# Patient Record
Sex: FEMALE | Race: WHITE | NOT HISPANIC OR LATINO | Employment: FULL TIME | URBAN - METROPOLITAN AREA
[De-identification: names, ages, dates, MRNs, and addresses within clinical notes are randomized per-mention and may not be internally consistent; named-entity substitution may affect disease eponyms.]

---

## 2020-08-06 DIAGNOSIS — N94.89 PELVIC CONGESTION SYNDROME: Primary | ICD-10-CM

## 2020-08-06 DIAGNOSIS — Z12.31 ENCOUNTER FOR SCREENING MAMMOGRAM FOR MALIGNANT NEOPLASM OF BREAST: Primary | ICD-10-CM

## 2020-08-21 ENCOUNTER — HOSPITAL ENCOUNTER (OUTPATIENT)
Dept: RADIOLOGY | Facility: HOSPITAL | Age: 45
Discharge: HOME | End: 2020-08-21
Attending: SPECIALIST
Payer: COMMERCIAL

## 2020-08-21 DIAGNOSIS — N94.89 PELVIC CONGESTION SYNDROME: ICD-10-CM

## 2020-08-21 DIAGNOSIS — Z12.31 ENCOUNTER FOR SCREENING MAMMOGRAM FOR MALIGNANT NEOPLASM OF BREAST: ICD-10-CM

## 2020-08-21 PROCEDURE — 76830 TRANSVAGINAL US NON-OB: CPT | Mod: TC,PO

## 2020-08-21 PROCEDURE — 77067 SCR MAMMO BI INCL CAD: CPT | Mod: TC,PO

## 2020-10-02 ENCOUNTER — HOSPITAL ENCOUNTER (OUTPATIENT)
Dept: RADIOLOGY | Facility: HOSPITAL | Age: 45
Discharge: HOME | End: 2020-10-02
Attending: SPECIALIST
Payer: COMMERCIAL

## 2020-10-02 DIAGNOSIS — R92.2 INCONCLUSIVE MAMMOGRAM: ICD-10-CM

## 2020-10-02 PROCEDURE — 76642 ULTRASOUND BREAST LIMITED: CPT | Mod: TC,50,PO

## 2021-08-20 ENCOUNTER — LAB VISIT (OUTPATIENT)
Dept: FAMILY MEDICINE | Facility: CLINIC | Age: 46
End: 2021-08-20
Payer: COMMERCIAL

## 2021-08-20 DIAGNOSIS — Z20.822 ENCOUNTER FOR LABORATORY TESTING FOR COVID-19 VIRUS: ICD-10-CM

## 2021-08-20 PROCEDURE — U0003 INFECTIOUS AGENT DETECTION BY NUCLEIC ACID (DNA OR RNA); SEVERE ACUTE RESPIRATORY SYNDROME CORONAVIRUS 2 (SARS-COV-2) (CORONAVIRUS DISEASE [COVID-19]), AMPLIFIED PROBE TECHNIQUE, MAKING USE OF HIGH THROUGHPUT TECHNOLOGIES AS DESCRIBED BY CMS-2020-01-R: HCPCS | Performed by: FAMILY MEDICINE

## 2021-08-20 PROCEDURE — U0005 INFEC AGEN DETEC AMPLI PROBE: HCPCS | Performed by: FAMILY MEDICINE

## 2021-08-22 LAB
SARS-COV-2 RNA RESP QL NAA+PROBE: DETECTED
SARS-COV-2- CYCLE NUMBER: 16.6

## 2022-02-25 ENCOUNTER — OFFICE VISIT (OUTPATIENT)
Dept: PODIATRY | Facility: CLINIC | Age: 47
End: 2022-02-25

## 2022-02-25 VITALS
TEMPERATURE: 98 F | DIASTOLIC BLOOD PRESSURE: 77 MMHG | HEART RATE: 97 BPM | SYSTOLIC BLOOD PRESSURE: 117 MMHG | WEIGHT: 172 LBS | HEIGHT: 67 IN | BODY MASS INDEX: 27 KG/M2

## 2022-02-25 DIAGNOSIS — G57.91 NEURITIS OF RIGHT FOOT: ICD-10-CM

## 2022-02-25 DIAGNOSIS — G57.92 NEURITIS OF LEFT FOOT: ICD-10-CM

## 2022-02-25 DIAGNOSIS — M72.2 PLANTAR FASCIITIS, BILATERAL: Primary | ICD-10-CM

## 2022-02-25 DIAGNOSIS — G89.29 CHRONIC FOOT PAIN, UNSPECIFIED LATERALITY: ICD-10-CM

## 2022-02-25 DIAGNOSIS — M79.673 CHRONIC FOOT PAIN, UNSPECIFIED LATERALITY: ICD-10-CM

## 2022-02-25 PROCEDURE — 99999 PR PBB SHADOW E&M-EST. PATIENT-LVL III: CPT | Mod: PBBFAC,,, | Performed by: PODIATRIST

## 2022-02-25 PROCEDURE — 99203 OFFICE O/P NEW LOW 30 MIN: CPT | Mod: S$PBB,,, | Performed by: PODIATRIST

## 2022-02-25 PROCEDURE — 99213 OFFICE O/P EST LOW 20 MIN: CPT | Mod: PBBFAC | Performed by: PODIATRIST

## 2022-02-25 PROCEDURE — 99999 PR PBB SHADOW E&M-EST. PATIENT-LVL III: ICD-10-PCS | Mod: PBBFAC,,, | Performed by: PODIATRIST

## 2022-02-25 PROCEDURE — 99203 PR OFFICE/OUTPT VISIT, NEW, LEVL III, 30-44 MIN: ICD-10-PCS | Mod: S$PBB,,, | Performed by: PODIATRIST

## 2022-02-25 PROCEDURE — 96372 THER/PROPH/DIAG INJ SC/IM: CPT | Mod: PBBFAC

## 2022-02-25 RX ORDER — ROSUVASTATIN CALCIUM 20 MG/1
TABLET, COATED ORAL
COMMUNITY

## 2022-02-25 RX ORDER — BETAMETHASONE SODIUM PHOSPHATE AND BETAMETHASONE ACETATE 3; 3 MG/ML; MG/ML
18 INJECTION, SUSPENSION INTRA-ARTICULAR; INTRALESIONAL; INTRAMUSCULAR; SOFT TISSUE
Status: COMPLETED | OUTPATIENT
Start: 2022-02-25 | End: 2022-02-25

## 2022-02-25 RX ORDER — AA/PROT/LYSINE/METHIO/VIT C/B6 50-12.5 MG
TABLET ORAL
COMMUNITY

## 2022-02-25 RX ORDER — HYDROCODONE BITARTRATE AND ACETAMINOPHEN 10; 325 MG/1; MG/1
TABLET ORAL
COMMUNITY
End: 2023-08-11 | Stop reason: SDUPTHER

## 2022-02-25 RX ORDER — AZELASTINE 1 MG/ML
SPRAY, METERED NASAL
COMMUNITY

## 2022-02-25 RX ADMIN — BETAMETHASONE SODIUM PHOSPHATE AND BETAMETHASONE ACETATE 18 MG: 3; 3 INJECTION, SUSPENSION INTRA-ARTICULAR; INTRALESIONAL; INTRAMUSCULAR at 12:02

## 2022-03-01 NOTE — PROGRESS NOTES
Subjective:       Patient ID: Antonia Salazar is a 46 y.o. female.    Chief Complaint: Heel Pain and Foot Pain  Presents with complaint constant bilateral heel pain, 9-10 months.  States it will get better, not resolve, and then worsen.  She has tried massage, appropriate shoes with support and received heel injections about a week ago with no improvement.  Reports burning radiating pain, significant pain upon 1st steps, severe pain in the morning.  Pain level 10/10    History reviewed. No pertinent past medical history.  Past Surgical History:   Procedure Laterality Date    carpel      both wrist     HERNIA REPAIR      TOE SURGERY       History reviewed. No pertinent family history.  Social History     Socioeconomic History    Marital status: Single   Tobacco Use    Smoking status: Former Smoker     Years: 25.00     Types: Cigarettes    Smokeless tobacco: Current User    Tobacco comment: vape   Substance and Sexual Activity    Alcohol use: Yes     Comment: occasional    Drug use: Never    Sexual activity: Yes     Partners: Male       Current Outpatient Medications   Medication Sig Dispense Refill    azelastine (ASTELIN) 137 mcg (0.1 %) nasal spray azelastine 137 mcg (0.1 %) nasal spray aerosol      coenzyme Q10 10 mg capsule Co Q 10      HYDROcodone-acetaminophen (NORCO)  mg per tablet Norco 10 mg-325 mg tablet   Take 1 tablet every 4 hours by oral route.      linaCLOtide (LINZESS) 290 mcg Cap capsule Linzess 290 mcg capsule      rosuvastatin (CRESTOR) 20 MG tablet rosuvastatin 20 mg tablet       No current facility-administered medications for this visit.     Review of patient's allergies indicates:   Allergen Reactions    Sulfa (sulfonamide antibiotics) Hives       Review of Systems   HENT: Negative for congestion.    Respiratory: Negative for cough and shortness of breath.    Cardiovascular: Negative for leg swelling.   Musculoskeletal: Negative for gait problem.   All other systems  "reviewed and are negative.      Objective:      Vitals:    02/25/22 1125   BP: 117/77   Pulse: 97   Temp: 98 °F (36.7 °C)   Weight: 78 kg (172 lb)   Height: 5' 6.5" (1.689 m)     Physical Exam  Vitals and nursing note reviewed.   Constitutional:       General: She is not in acute distress.  Cardiovascular:      Pulses:           Dorsalis pedis pulses are 2+ on the right side and 2+ on the left side.        Posterior tibial pulses are 2+ on the right side and 2+ on the left side.   Pulmonary:      Effort: Pulmonary effort is normal.   Musculoskeletal:         General: Swelling and tenderness present.      Right foot: Normal range of motion. No deformity.      Left foot: Normal range of motion. No deformity.      Comments: Tense painful calcaneal fat pad with pain at plantar fascial insertion bilateral   Feet:      Right foot:      Skin integrity: Skin integrity normal.      Left foot:      Skin integrity: Skin integrity normal.   Skin:     Capillary Refill: Capillary refill takes less than 2 seconds.   Neurological:      General: No focal deficit present.      Comments: Neuritis Walter's nerve radiating to plantar nerves bilateral feet              Assessment:       1. Plantar fasciitis, bilateral    2. Neuritis of right foot    3. Neuritis of left foot    4. Chronic foot pain, unspecified laterality        Plan:         18 MG BETAMETHASONE IM LEFT HIP      Reviewed plantar fasciitis at length with patient.  Explained to patient with her high level of pain she is definitely experiencing some neuritis with plantar fasciitis.  We discussed neuritis at length due to inflammation nerve and drained inside the heel to the bottom the foot.  Advised patient she developed this due to the length of time this condition has been present and excessive inflammation  Explained plantar fasciitis is a strain/sprain of the supportive band through the arch on the bottom of the foot and needs to be supported properly along with " treatment for inflammation. Explained bracing of the plantar fascia through appropriate arch support is crucial.  We discussed appropriate arch support and variety of   appropriate shoes, how to gradually adjust to wearing arch supports comfortably, always removing insoles first to accommodate supports.  Advised once comfortable should be worn at all times of weight-bearing until pain resolves, then any time for work if possible, excessive activity, exercise, prolonged walking or standing.   We reviewed appropriate shoes for indoors, no flat shoes or walking barefoot.    Reviewed stretching at length, should be performed in the morning before getting out of bed shoes with arch support at bedside    Reviewed ice/cool therapy and frequency this should be performed.    Reviewed topical anti-inflammatory, over-the-counter Voltaren gel  Instructed patient on use of over-the-counter oral anti-inflammatory/NSAID therapy utilizing medication of her choice which is most effective and well tolerated. Take as directed x1 week with meals, discontinue of stomach upset, reduce by 1/2 2nd week  We discussed x-rays patient declined today  Advised with symptoms of carpal tunnel and neuritis in feet we discussed Cymbalta.  Instructed patient to discuss this medication with her PCP.  We did discuss IM cortisone injection to help start the healing process so she can follow-up all above conservative treatments.  Reviewed IM cortisone injection, effectiveness and decreasing inflammation, potential side effects and length of time injection may be beneficial  Patient was in understanding and agreement with treatment plan.  I counseled the patient on their conditions, implications and medical management.  Instructed patient to contact the office with any changes, questions, concerns, worsening of symptoms.   Total face-to-face time, exam, assessment, treatment, discussion, documentation 30 minutes, more than half this time spent on  consultation and coordination of care.  Follow up as needed, if not significant improvement in 2 weeks or not resolved in 4 weeks    This note was created using [employer] voice recognition software that occasionally misinterpreted phrases or words.

## 2022-03-29 ENCOUNTER — TELEPHONE (OUTPATIENT)
Dept: PODIATRY | Facility: CLINIC | Age: 47
End: 2022-03-29

## 2022-03-29 NOTE — TELEPHONE ENCOUNTER
----- Message from Mercy Eckert sent at 3/29/2022  1:07 PM CDT -----  Contact: Patient  Type:  Needs Medical Advice    Who Called:  Patient      Would the patient rather a call back or a response via [facility]? [facility]      Best Call Back Number:  [phone] (home)     Additional Information:  Patient states shots have lasted up till a few days ago and the pain has come back, Its about 60% back     Please message her phone on voicemail or if they need to call anything in use   [facility]

## 2022-03-29 NOTE — TELEPHONE ENCOUNTER
Patient stated injection helped heel pain and now pain has returned and getting about 60% relief only. Patient stated she was instructed to call back with update to see how she was doing.    Pharmacy:  [facility]

## 2022-09-16 DIAGNOSIS — N83.202 LEFT OVARIAN CYST: ICD-10-CM

## 2022-09-16 DIAGNOSIS — N60.02 SOLITARY CYST OF LEFT BREAST: Primary | ICD-10-CM

## 2022-10-21 ENCOUNTER — HOSPITAL ENCOUNTER (OUTPATIENT)
Dept: RADIOLOGY | Facility: HOSPITAL | Age: 47
Discharge: HOME | End: 2022-10-21
Attending: SPECIALIST
Payer: COMMERCIAL

## 2022-10-21 DIAGNOSIS — N60.02 SOLITARY CYST OF LEFT BREAST: ICD-10-CM

## 2022-10-21 PROCEDURE — 77062 BREAST TOMOSYNTHESIS BI: CPT | Mod: TC,PO

## 2022-10-21 PROCEDURE — 76642 ULTRASOUND BREAST LIMITED: CPT | Mod: TC,50,PO

## 2022-12-16 ENCOUNTER — PATIENT MESSAGE (OUTPATIENT)
Dept: FAMILY MEDICINE | Facility: CLINIC | Age: 47
End: 2022-12-16

## 2023-08-03 ENCOUNTER — PATIENT MESSAGE (OUTPATIENT)
Dept: FAMILY MEDICINE | Facility: CLINIC | Age: 48
End: 2023-08-03
Payer: COMMERCIAL

## 2023-08-11 ENCOUNTER — OFFICE VISIT (OUTPATIENT)
Dept: FAMILY MEDICINE | Facility: CLINIC | Age: 48
End: 2023-08-11
Payer: COMMERCIAL

## 2023-08-11 VITALS
HEART RATE: 102 BPM | TEMPERATURE: 98 F | DIASTOLIC BLOOD PRESSURE: 84 MMHG | BODY MASS INDEX: 29.11 KG/M2 | HEIGHT: 66 IN | SYSTOLIC BLOOD PRESSURE: 130 MMHG | WEIGHT: 181.13 LBS | OXYGEN SATURATION: 97 %

## 2023-08-11 DIAGNOSIS — G89.4 CHRONIC PAIN SYNDROME: Primary | ICD-10-CM

## 2023-08-11 DIAGNOSIS — N60.01 BILATERAL BREAST CYSTS: ICD-10-CM

## 2023-08-11 DIAGNOSIS — N60.02 BILATERAL BREAST CYSTS: ICD-10-CM

## 2023-08-11 DIAGNOSIS — E78.00 ELEVATED CHOLESTEROL: ICD-10-CM

## 2023-08-11 DIAGNOSIS — M54.16 LUMBAR RADICULOPATHY: ICD-10-CM

## 2023-08-11 DIAGNOSIS — M54.12 CERVICAL RADICULOPATHY: ICD-10-CM

## 2023-08-11 PROBLEM — E78.5 HLD (HYPERLIPIDEMIA): Status: ACTIVE | Noted: 2023-08-11

## 2023-08-11 PROBLEM — E78.5 HLD (HYPERLIPIDEMIA): Status: RESOLVED | Noted: 2023-08-11 | Resolved: 2023-08-11

## 2023-08-11 PROBLEM — G89.29 CHRONIC PAIN: Status: ACTIVE | Noted: 2023-08-11

## 2023-08-11 PROCEDURE — 99204 PR OFFICE/OUTPT VISIT, NEW, LEVL IV, 45-59 MIN: ICD-10-PCS | Mod: S$GLB,,, | Performed by: FAMILY MEDICINE

## 2023-08-11 PROCEDURE — 3075F SYST BP GE 130 - 139MM HG: CPT | Mod: CPTII,S$GLB,, | Performed by: FAMILY MEDICINE

## 2023-08-11 PROCEDURE — 3008F PR BODY MASS INDEX (BMI) DOCUMENTED: ICD-10-PCS | Mod: CPTII,S$GLB,, | Performed by: FAMILY MEDICINE

## 2023-08-11 PROCEDURE — 3075F PR MOST RECENT SYSTOLIC BLOOD PRESS GE 130-139MM HG: ICD-10-PCS | Mod: CPTII,S$GLB,, | Performed by: FAMILY MEDICINE

## 2023-08-11 PROCEDURE — 1159F PR MEDICATION LIST DOCUMENTED IN MEDICAL RECORD: ICD-10-PCS | Mod: CPTII,S$GLB,, | Performed by: FAMILY MEDICINE

## 2023-08-11 PROCEDURE — 3008F BODY MASS INDEX DOCD: CPT | Mod: CPTII,S$GLB,, | Performed by: FAMILY MEDICINE

## 2023-08-11 PROCEDURE — 3079F PR MOST RECENT DIASTOLIC BLOOD PRESSURE 80-89 MM HG: ICD-10-PCS | Mod: CPTII,S$GLB,, | Performed by: FAMILY MEDICINE

## 2023-08-11 PROCEDURE — 3079F DIAST BP 80-89 MM HG: CPT | Mod: CPTII,S$GLB,, | Performed by: FAMILY MEDICINE

## 2023-08-11 PROCEDURE — 99204 OFFICE O/P NEW MOD 45 MIN: CPT | Mod: S$GLB,,, | Performed by: FAMILY MEDICINE

## 2023-08-11 PROCEDURE — 1159F MED LIST DOCD IN RCRD: CPT | Mod: CPTII,S$GLB,, | Performed by: FAMILY MEDICINE

## 2023-08-11 RX ORDER — GABAPENTIN 300 MG/1
300 CAPSULE ORAL NIGHTLY
Qty: 30 CAPSULE | Refills: 11 | Status: SHIPPED | OUTPATIENT
Start: 2023-08-11 | End: 2024-08-10

## 2023-08-11 RX ORDER — HYDROCODONE BITARTRATE AND ACETAMINOPHEN 10; 325 MG/1; MG/1
1 TABLET ORAL
Qty: 30 TABLET | Refills: 0 | Status: SHIPPED | OUTPATIENT
Start: 2023-08-11 | End: 2023-09-22 | Stop reason: SDUPTHER

## 2023-08-11 RX ORDER — DULOXETIN HYDROCHLORIDE 30 MG/1
30 CAPSULE, DELAYED RELEASE ORAL DAILY
Qty: 30 CAPSULE | Refills: 11 | Status: SHIPPED | OUTPATIENT
Start: 2023-08-11 | End: 2023-11-10

## 2023-08-11 NOTE — PROGRESS NOTES
"  [facility]  Primary Care Clinics - [place], [place]    Family Medicine Office Visit    Chief Complaint   Patient presents with    [facility]        HPI:  48 female new patient.  Chart review shows history of plantar fasciitis, breast cyst, elevated cholesterol, and chronic neck pain    Was former patient of Shane Morrison - [facility] review shows she was getting #120 hydrocodone 10mg per month and #90 valium 10mg per month, but not filled in approximately 1 year.    Hyperlipidemia stable on appropriate intensity statin therapy  Is enrolled in physical therapy, chiropractic care, accupuncture.  Now seeing pain management.  Has MRI with her    States gabapentin was "weird", but she only took this once or twice    MRI review:  bilateral L5-S1 foraminal stenosis affecting S1 Nerve Roots.  Severe C3-4 foraminal stenosis.  C6-7 canal stenosis.    She expresses interest in spinal surgery    ROS: as above    Vitals:    08/11/23 0830   BP: 130/84   Pulse: 102   Temp: 97.5 °F (36.4 °C)   SpO2: 97%   Weight: 82.1 kg (181 lb 1.6 oz)   Height: 5' 6" (1.676 m)      Body mass index is 29.23 kg/m².      General:  AOx3, well nourished and developed in no acute distress  Eyes:  PERRLA, EOMI, vision intact grossly  ENT:  normal hearing, moist oral mucosa  Neck:  trachea midline with no masses or thyromegaly  Heart:  RRR, no murmurs.  No edema noted, extremities warm and well perfused  Lungs:  clear to auscultation bilaterally with symmetric chest movement  Abdomen:  Soft, nontender, nondistended.  Normal bowel sounds  Musculoskeletal:  Normal gait.  Normal posture.  Normal muscular development with no joint swelling.  Neurological:  CN II-XII grossly intact. Symmetric strength and sensation  Psych:  Normal mood and affect.  Able to demonstrate good judgement and personal insight.      Assessment/Plan:    1. Chronic pain syndrome    2. Lumbar radiculopathy  -     Ambulatory referral/consult to Neurosurgery; Future; Expected " date: 08/18/2023    3. Bilateral breast cysts    4. Elevated cholesterol    5. Cervical radiculopathy  -     Ambulatory referral/consult to Neurosurgery; Future; Expected date: 08/18/2023          Patient is actively pursuing numerous alternative therapies, and is motivated to improve symptoms.  Obviously I will not refill medications to the extent that Dr. Morrison was, but 1 daily norco may serve a role here, alongside gabapentin and cymbalta.  As above - placed referral Dr. Adorno  Stable  Stable on statin

## 2023-08-11 NOTE — PATIENT INSTRUCTIONS
Start new medication regimen today:    Gabapentin at night, cymbalta in the morning, and norco as needed  Will place referral to Dr. Adoron for spinal surgery    Follow up 1 month

## 2023-08-14 ENCOUNTER — PATIENT MESSAGE (OUTPATIENT)
Dept: PRIMARY CARE CLINIC | Facility: CLINIC | Age: 48
End: 2023-08-14
Payer: COMMERCIAL

## 2023-09-22 ENCOUNTER — OFFICE VISIT (OUTPATIENT)
Dept: FAMILY MEDICINE | Facility: CLINIC | Age: 48
End: 2023-09-22
Payer: COMMERCIAL

## 2023-09-22 VITALS
TEMPERATURE: 98 F | BODY MASS INDEX: 29.14 KG/M2 | OXYGEN SATURATION: 97 % | SYSTOLIC BLOOD PRESSURE: 120 MMHG | DIASTOLIC BLOOD PRESSURE: 80 MMHG | WEIGHT: 181.31 LBS | HEART RATE: 89 BPM | HEIGHT: 66 IN

## 2023-09-22 DIAGNOSIS — N60.02 BILATERAL BREAST CYSTS: Primary | ICD-10-CM

## 2023-09-22 DIAGNOSIS — N60.01 BILATERAL BREAST CYSTS: Primary | ICD-10-CM

## 2023-09-22 DIAGNOSIS — M54.16 LUMBAR RADICULOPATHY: ICD-10-CM

## 2023-09-22 DIAGNOSIS — E78.00 ELEVATED CHOLESTEROL: ICD-10-CM

## 2023-09-22 DIAGNOSIS — M54.12 CERVICAL RADICULOPATHY: ICD-10-CM

## 2023-09-22 PROCEDURE — 3079F PR MOST RECENT DIASTOLIC BLOOD PRESSURE 80-89 MM HG: ICD-10-PCS | Mod: CPTII,S$GLB,, | Performed by: FAMILY MEDICINE

## 2023-09-22 PROCEDURE — 3008F PR BODY MASS INDEX (BMI) DOCUMENTED: ICD-10-PCS | Mod: CPTII,S$GLB,, | Performed by: FAMILY MEDICINE

## 2023-09-22 PROCEDURE — 3079F DIAST BP 80-89 MM HG: CPT | Mod: CPTII,S$GLB,, | Performed by: FAMILY MEDICINE

## 2023-09-22 PROCEDURE — 1159F PR MEDICATION LIST DOCUMENTED IN MEDICAL RECORD: ICD-10-PCS | Mod: CPTII,S$GLB,, | Performed by: FAMILY MEDICINE

## 2023-09-22 PROCEDURE — 3008F BODY MASS INDEX DOCD: CPT | Mod: CPTII,S$GLB,, | Performed by: FAMILY MEDICINE

## 2023-09-22 PROCEDURE — 99214 OFFICE O/P EST MOD 30 MIN: CPT | Mod: S$GLB,,, | Performed by: FAMILY MEDICINE

## 2023-09-22 PROCEDURE — 3074F SYST BP LT 130 MM HG: CPT | Mod: CPTII,S$GLB,, | Performed by: FAMILY MEDICINE

## 2023-09-22 PROCEDURE — 1159F MED LIST DOCD IN RCRD: CPT | Mod: CPTII,S$GLB,, | Performed by: FAMILY MEDICINE

## 2023-09-22 PROCEDURE — 99214 PR OFFICE/OUTPT VISIT, EST, LEVL IV, 30-39 MIN: ICD-10-PCS | Mod: S$GLB,,, | Performed by: FAMILY MEDICINE

## 2023-09-22 PROCEDURE — 3074F PR MOST RECENT SYSTOLIC BLOOD PRESSURE < 130 MM HG: ICD-10-PCS | Mod: CPTII,S$GLB,, | Performed by: FAMILY MEDICINE

## 2023-09-22 RX ORDER — HYDROCODONE BITARTRATE AND ACETAMINOPHEN 10; 325 MG/1; MG/1
1 TABLET ORAL
Qty: 40 TABLET | Refills: 0 | Status: SHIPPED | OUTPATIENT
Start: 2023-09-22 | End: 2023-09-25 | Stop reason: SDUPTHER

## 2023-09-22 NOTE — PATIENT INSTRUCTIONS
Will make small increase to pain medication, but definitely take cymbalta and gabapentin to help with pain  Will place referral to Dr. Lancaster to investigate spine    Follow up 3 months

## 2023-09-22 NOTE — PROGRESS NOTES
"    [facility]  [facility] Care Clin[facility] - [place], [place]    Family Medicine Office Visit    Chief Complaint   Patient presents with    Follow-up     Back,neck,hand pain         HPI:  Follow up chronic pain.  Has questions about if cymbalta will be effective    Hyperlipidemia stable on appropriate intensity statin therapy    MRI shows both stenosis to lumbar and cervical spine    Has made marked progress reducing reliance on controlled substances    ROS: as above    Vitals:    09/22/23 1132   BP: 120/80   Pulse: 89   Temp: 98.3 °F (36.8 °C)   SpO2: 97%   Weight: 82.2 kg (181 lb 4.8 oz)   Height: 5' 6" (1.676 m)      Body mass index is 29.26 kg/m².      General:  AOx3, well nourished and developed in no acute distress  Eyes:  PERRLA, EOMI, vision intact grossly  ENT:  normal hearing, moist oral mucosa  Neck:  trachea midline with no masses or thyromegaly  Heart:  RRR, no murmurs.  No edema noted, extremities warm and well perfused  Lungs:  clear to auscultation bilaterally with symmetric chest movement  Abdomen:  Soft, nontender, nondistended.  Normal bowel sounds  Musculoskeletal:  Normal gait.  Normal posture.  Normal muscular development with no joint swelling.  Neurological:  CN II-XII grossly intact. Symmetric strength and sensation  Psych:  Normal mood and affect.  Able to demonstrate good judgement and personal insight.      Assessment/Plan:    1. Bilateral breast cysts    2. Elevated cholesterol    3. Lumbar radiculopathy    4. Cervical radiculopathy        Stable  Stable on statin  Start cymbalta and gabapentin to help pain and anxiety  As above      "

## 2023-09-25 ENCOUNTER — PATIENT MESSAGE (OUTPATIENT)
Dept: FAMILY MEDICINE | Facility: CLINIC | Age: 48
End: 2023-09-25
Payer: COMMERCIAL

## 2023-09-25 NOTE — TELEPHONE ENCOUNTER
Last office visit: 9/22/2023    ----- Message from Ioana Donald sent at 9/25/2023 10:28 AM CDT -----  Contact: Patient  Type:  Needs Medical Advice    Who Called: Patient     Pharmacy name and phone #:    [id number] - [place], [place] - [address]  [address]  [place] [address]  Phone: [phone] Fax: [phone]          Would the patient rather a call back or a response via [facility]? Call if needed    Best Call Back Number: [phone] (home)     Additional Information: HYDROcodone-acetaminophen (NORCO)  mg per tablet  Has been out of stock at pharmacy for 2 weeks. Patient is requesting that meds be sent to another pharmacy. Please call to advise

## 2023-09-26 NOTE — TELEPHONE ENCOUNTER
----- Message from Mallorie Hilton sent at 9/26/2023  1:55 PM CDT -----  Type: Needs Medical Advice  Who Called:  pt     Best Call Back Number: [phone]    Additional Information: pt wants to know if  [facility] , [facility] please advise     [facility] [address] [place] [address], [place] - [address] [address]  Phone: [phone] Fax: [phone]

## 2023-09-27 RX ORDER — HYDROCODONE BITARTRATE AND ACETAMINOPHEN 10; 325 MG/1; MG/1
1 TABLET ORAL
Qty: 40 TABLET | Refills: 0 | Status: SHIPPED | OUTPATIENT
Start: 2023-09-27 | End: 2023-10-03 | Stop reason: SDUPTHER

## 2023-10-02 ENCOUNTER — TELEPHONE (OUTPATIENT)
Dept: FAMILY MEDICINE | Facility: CLINIC | Age: 48
End: 2023-10-02
Payer: COMMERCIAL

## 2023-10-02 NOTE — TELEPHONE ENCOUNTER
----- Message from Darwin French sent at 9/29/2023 10:51 AM CDT -----  Regarding: change pharm again  Type:  Needs Medical Advice    Who Called: PT      Pharmacy name and phone #:      [id number] - [place], [place] - [address]  [address] [place] [place] [address]  Phone: [phone] Fax: [phone]        Would the patient rather a call back or a response via [facility]? Call back    Best Call Back Number: [phone]      Additional Information: Sts that [facility] is also out of the HYDROcodone-acetaminophen (NORCO)  mg per tablet and told [facility] try [facility] they have a different distributor and needs to get it changed to there so she can see if they have it in stock won't tell her unless they have a script.   Please advise -- Thank you

## 2023-10-02 NOTE — TELEPHONE ENCOUNTER
Duplicate message.  ----- Message from Ioana Donald sent at 9/29/2023  4:53 PM CDT -----  Contact: Patient  Type:  Needs Medical Advice    Who Called: Patient     Pharmacy name and phone #:      [id number] - [place], [place] - [address]  [address] [place] [address]  Phone: [phone] Fax: [phone]      Would the patient rather a call back or a response via [facility]? Call    Best Call Back Number: [phone] (home)     Additional Information: Patient is calling for a status update on the meds being transferred to [facility]. Please advise

## 2023-10-02 NOTE — TELEPHONE ENCOUNTER
This message was handled by Juan Edmondson LPN and patient will be picking up written Rx tomorrow.  ----- Message from Chantal Jess sent at 10/2/2023  1:14 PM CDT -----  Regarding: Patient Returning Call  Contact: patient at [phone]  Type:  Patient Returning Call    Who Called:  patient at [phone]    Who Left Message for Patient:  Juan Byrnes LPN  Does the patient know what this is regarding?:  yes    Additional Information:  calling to discuss options for medication refill. Please call and advise. Thank you

## 2023-10-02 NOTE — TELEPHONE ENCOUNTER
----- Message from Saeid Starks sent at 10/2/2023 10:42 AM CDT -----  Regarding: Return Call  Contact: patient  Type:  Patient Returning Call    Who Called:patient  Who Left Message for Patient:office nurse   Does the patient know what this is regarding?:Patient cannot find what pharmacy medication sent to HYDROcodone-acetaminophen (NORCO)  mg per tablet  Would the patient rather a call back or a response via [facility]? Current pharmacy stated office staff canceled this order  Best Call Back Number:[phone]  Additional Information:

## 2023-10-03 ENCOUNTER — TELEPHONE (OUTPATIENT)
Dept: FAMILY MEDICINE | Facility: CLINIC | Age: 48
End: 2023-10-03
Payer: COMMERCIAL

## 2023-10-03 RX ORDER — HYDROCODONE BITARTRATE AND ACETAMINOPHEN 10; 325 MG/1; MG/1
1 TABLET ORAL
Qty: 40 TABLET | Refills: 0 | Status: SHIPPED | OUTPATIENT
Start: 2023-10-03 | End: 2023-11-10 | Stop reason: SDUPTHER

## 2023-10-03 NOTE — TELEPHONE ENCOUNTER
----- Message from Jose G Ayala MD sent at 10/3/2023 12:55 PM CDT -----  Regarding: RE: MEDICINE PRESCRIPTION  Medication was sent to pharmacy on 9/27  ----- Message -----  From: Juan Bynres LPN  Sent: 10/2/2023   4:28 PM CDT  To: Jose G Ayala MD  Subject: FW: MEDICINE PRESCRIPTION                          ----- Message -----  From: Tori Weems  Sent: 10/2/2023   4:25 PM CDT  To: Jamie PFEIFFER Staff  Subject: MEDICINE PRESCRIPTION                            Good evening,    Patient stated that she will be by tomorrow around 130pm to  prescription.  If you have any questions, please contact patient before she arrives.

## 2023-10-03 NOTE — TELEPHONE ENCOUNTER
Completed yesterday, duplicate message.  ----- Message from Mallorie Hilton sent at 10/2/2023  3:59 PM CDT -----  Type: Needs Medical Advice  Who Called:  pt     Best Call Back Number: [phone]    Additional Information: pt calling in regards to [employer] message wants paper copy of rx please advise

## 2023-10-06 ENCOUNTER — TELEPHONE (OUTPATIENT)
Dept: FAMILY MEDICINE | Facility: CLINIC | Age: 48
End: 2023-10-06
Payer: COMMERCIAL

## 2023-10-06 LAB
HUMAN PAPILLOMAVIRUS (HPV): NORMAL
PAP RECOMMENDATION EXT: NORMAL
PAP SMEAR: NORMAL

## 2023-10-06 RX ORDER — HYDROCODONE BITARTRATE AND ACETAMINOPHEN 10; 300 MG/1; MG/1
1 TABLET ORAL DAILY
Qty: 30 EACH | Refills: 0 | Status: SHIPPED | OUTPATIENT
Start: 2023-10-06 | End: 2023-11-10

## 2023-10-06 NOTE — TELEPHONE ENCOUNTER
----- Message from Kenna Carlson sent at 10/6/2023  7:46 AM CDT -----  Type:  Patient Returning Call    Who Called:pt   Who Left Message for Patient:  Does the patient know what this is regarding?:rx   Would the patient rather a call back or a response via [facility]? Call   Best Call Back Number:[phone]  Additional Information: pt states she would like rx called in to the original pharmacy [facility]  and states they have the 10-300mg in stock but they will not last long. Pt would like rx called in to them

## 2023-10-06 NOTE — TELEPHONE ENCOUNTER
----- Message from Mala Hernandez sent at 10/6/2023  2:27 PM CDT -----  Contact: pt  Type: Needs Medical Advice         Who Called: pt  Best Call Back Number:[phone]  Additional Information: Requesting a call back regarding pt is following up on the rx request. Pt worried pharm will run out and not be able to fill. Pt worried that she will not be able to get her rx. Pt asking for the office to call her please.   Please Advise- Thank you

## 2023-10-10 ENCOUNTER — PATIENT MESSAGE (OUTPATIENT)
Dept: FAMILY MEDICINE | Facility: CLINIC | Age: 48
End: 2023-10-10
Payer: COMMERCIAL

## 2023-11-10 ENCOUNTER — OFFICE VISIT (OUTPATIENT)
Dept: FAMILY MEDICINE | Facility: CLINIC | Age: 48
End: 2023-11-10
Payer: COMMERCIAL

## 2023-11-10 VITALS
WEIGHT: 176.38 LBS | HEART RATE: 84 BPM | BODY MASS INDEX: 28.34 KG/M2 | SYSTOLIC BLOOD PRESSURE: 118 MMHG | HEIGHT: 66 IN | OXYGEN SATURATION: 98 % | TEMPERATURE: 98 F | DIASTOLIC BLOOD PRESSURE: 75 MMHG

## 2023-11-10 DIAGNOSIS — M54.12 CERVICAL RADICULOPATHY: ICD-10-CM

## 2023-11-10 DIAGNOSIS — Z12.31 SCREENING MAMMOGRAM FOR BREAST CANCER: ICD-10-CM

## 2023-11-10 DIAGNOSIS — M54.16 LUMBAR RADICULOPATHY: ICD-10-CM

## 2023-11-10 DIAGNOSIS — E78.00 ELEVATED CHOLESTEROL: Primary | ICD-10-CM

## 2023-11-10 PROCEDURE — 3008F PR BODY MASS INDEX (BMI) DOCUMENTED: ICD-10-PCS | Mod: CPTII,S$GLB,, | Performed by: FAMILY MEDICINE

## 2023-11-10 PROCEDURE — 3074F SYST BP LT 130 MM HG: CPT | Mod: CPTII,S$GLB,, | Performed by: FAMILY MEDICINE

## 2023-11-10 PROCEDURE — 3078F PR MOST RECENT DIASTOLIC BLOOD PRESSURE < 80 MM HG: ICD-10-PCS | Mod: CPTII,S$GLB,, | Performed by: FAMILY MEDICINE

## 2023-11-10 PROCEDURE — 99214 OFFICE O/P EST MOD 30 MIN: CPT | Mod: S$GLB,,, | Performed by: FAMILY MEDICINE

## 2023-11-10 PROCEDURE — 99214 PR OFFICE/OUTPT VISIT, EST, LEVL IV, 30-39 MIN: ICD-10-PCS | Mod: S$GLB,,, | Performed by: FAMILY MEDICINE

## 2023-11-10 PROCEDURE — 3078F DIAST BP <80 MM HG: CPT | Mod: CPTII,S$GLB,, | Performed by: FAMILY MEDICINE

## 2023-11-10 PROCEDURE — 3008F BODY MASS INDEX DOCD: CPT | Mod: CPTII,S$GLB,, | Performed by: FAMILY MEDICINE

## 2023-11-10 PROCEDURE — 3074F PR MOST RECENT SYSTOLIC BLOOD PRESSURE < 130 MM HG: ICD-10-PCS | Mod: CPTII,S$GLB,, | Performed by: FAMILY MEDICINE

## 2023-11-10 RX ORDER — HYDROCODONE BITARTRATE AND ACETAMINOPHEN 10; 325 MG/1; MG/1
1 TABLET ORAL
Qty: 40 TABLET | Refills: 0 | Status: SHIPPED | OUTPATIENT
Start: 2023-11-10 | End: 2023-12-13 | Stop reason: SDUPTHER

## 2023-11-10 NOTE — PATIENT INSTRUCTIONS
Get mammogram at [facility] medication  Followup with Dr. Lancaster    Follow up 3 months

## 2023-11-10 NOTE — PROGRESS NOTES
"    [facility]  [facility] Care Clin[facility] - [place], [place]    Family Medicine Office Visit    Chief Complaint   Patient presents with    Medication Refill        HPI:  followup    Sent to Dr. Lancaster previously, given MRI results of stenosis to cervical and lumbar spine - has appointment next week    Hyperlipidemia stable on appropriate intensity statin therapy  Still making notable attempts to wean down controlled substance reliance    Needs mammogram    ROS: as above    Vitals:    11/10/23 1159   BP: 118/75   Pulse: 84   Temp: 98.2 °F (36.8 °C)   SpO2: 98%   Weight: 80 kg (176 lb 6.4 oz)   Height: 5' 6" (1.676 m)      Body mass index is 28.47 kg/m².      General:  AOx3, well nourished and developed in no acute distress  Eyes:  PERRLA, EOMI, vision intact grossly  ENT:  normal hearing, moist oral mucosa  Neck:  trachea midline with no masses or thyromegaly  Heart:  RRR, no murmurs.  No edema noted, extremities warm and well perfused  Lungs:  clear to auscultation bilaterally with symmetric chest movement  Abdomen:  Soft, nontender, nondistended.  Normal bowel sounds  Musculoskeletal:  Normal gait.  Normal posture.  Normal muscular development with no joint swelling.  Neurological:  CN II-XII grossly intact. Symmetric strength and sensation  Psych:  Normal mood and affect.  Able to demonstrate good judgement and personal insight.      Assessment/Plan:    1. Elevated cholesterol    2. Lumbar radiculopathy    3. Cervical radiculopathy    4. Screening mammogram for breast cancer  -     Mammo Digital Screening Bilat w/ [facility]; Future; Expected date: 11/10/2023    Other orders  -     HYDROcodone-acetaminophen (NORCO)  mg per tablet; Take 1 tablet by mouth every 24 hours as needed for Pain.  Dispense: 40 tablet; Refill: 0          Stable  Has neurosurgery follow up pending  As above  Get mammogram    "

## 2023-11-17 ENCOUNTER — PATIENT MESSAGE (OUTPATIENT)
Dept: ADMINISTRATIVE | Facility: HOSPITAL | Age: 48
End: 2023-11-17
Payer: COMMERCIAL

## 2023-11-22 ENCOUNTER — PATIENT OUTREACH (OUTPATIENT)
Dept: ADMINISTRATIVE | Facility: HOSPITAL | Age: 48
End: 2023-11-22
Payer: COMMERCIAL

## 2023-11-22 NOTE — PROGRESS NOTES
Population Health Chart Review & Patient Outreach Details    Outreach Performed: YES Portal    Additional [facility] Notes:           Updates Requested / Reviewed:      Updated Care Coordination Note and External Sources: [facility]         Health Maintenance Topics Overdue:    Health Maintenance Due   Topic Date Due    Hepatitis C Screening  Never done    COVID-19 Vaccine (1) Never done    HIV Screening  Never done    TETANUS VACCINE  Never done    Sign Pain Contract  Never done    Complete Opioid Risk Tool  Never done    Hemoglobin A1c (Diabetic Prevention Screening)  Never done    Influenza Vaccine (1) Never done    Mammogram  10/21/2023         Health Maintenance Topic(s) Outreach Outcomes & Actions Taken:    Cervical Cancer Screening - Outreach Outcomes & Actions Taken  : External Records Uploaded & Care Team Updated if Applicable    Breast Cancer Screening - Outreach Outcomes & Actions Taken  : Mammogram Screening Scheduled Pt had Mammogram today at [facility]

## 2023-12-12 ENCOUNTER — TELEPHONE (OUTPATIENT)
Dept: FAMILY MEDICINE | Facility: CLINIC | Age: 48
End: 2023-12-12
Payer: COMMERCIAL

## 2023-12-12 NOTE — TELEPHONE ENCOUNTER
LOV 11/10/23. NOV 02/12/24 See message below concerning refill and advise. Patient also stated that she is also now on Estrogen and Progesterone.  Also her Neuro appt is on Dec 22

## 2023-12-12 NOTE — TELEPHONE ENCOUNTER
----- Message from Darwin French sent at 12/12/2023  1:17 PM CST -----  Regarding: Refill request  Type:  RX Refill Request    Who Called: pt  Refill or New Rx:refill    RX Name and Strength:HYDROcodone-acetaminophen (NORCO)  mg per tablet  How is the patient currently taking it? (ex. 1XDay):as directed  Is this a 30 day or 90 day RX:30    Preferred Pharmacy with phone number   [id number] - [place] [address] [address]  [address] [place] [address] [place] [address]  Phone: [phone] Fax: [phone]      Local or Mail Order:local  Ordering Provider:Jamie    Would the patient rather a call back or a response via [facility]? Call back    Best Call Back Number:[phone]      Additional Information:  sts she wants Dr Ayala to know she is also now on Estrogen and Progesterone.  Also her Neuro appt is on Dec 22.    Please advise -- Thank you

## 2023-12-13 RX ORDER — HYDROCODONE BITARTRATE AND ACETAMINOPHEN 10; 325 MG/1; MG/1
1 TABLET ORAL
Qty: 40 TABLET | Refills: 0 | Status: SHIPPED | OUTPATIENT
Start: 2023-12-13 | End: 2023-12-15 | Stop reason: SDUPTHER

## 2023-12-14 ENCOUNTER — TELEPHONE (OUTPATIENT)
Dept: FAMILY MEDICINE | Facility: CLINIC | Age: 48
End: 2023-12-14
Payer: COMMERCIAL

## 2023-12-14 NOTE — TELEPHONE ENCOUNTER
----- Message from Darwin French sent at 12/14/2023  1:19 PM CST -----  Regarding: RX issues  Type:  Needs Medical Advice    Who Called: PT      Pharmacy name and phone #:    [id number] [place] - [address]  [address]  [place] [address]  Phone: [phone] Fax: [phone]      Would the patient rather a call back or a response via [facility]? Call back     Best Call Back Number: [phone]      Additional Information: Sts that the pharm still won't fill the RX due to the prescription is written wrong.  Its written for 40 pills at 1 a day but for 30 days ..... Sts needs to be corrected to taking more per day if needed.  Please advise == Thank you Please advise -- Thank you

## 2023-12-15 ENCOUNTER — HOSPITAL ENCOUNTER (OUTPATIENT)
Dept: RADIOLOGY | Facility: HOSPITAL | Age: 48
Discharge: HOME | End: 2023-12-15
Attending: FAMILY MEDICINE
Payer: COMMERCIAL

## 2023-12-15 DIAGNOSIS — Z12.31 SCREENING MAMMOGRAM FOR BREAST CANCER: ICD-10-CM

## 2023-12-15 PROCEDURE — 77063 BREAST TOMOSYNTHESIS BI: CPT | Mod: 26,,, | Performed by: RADIOLOGY

## 2023-12-15 PROCEDURE — 77067 MAMMO DIGITAL SCREENING BILAT WITH TOMO: ICD-10-PCS | Mod: 26,,, | Performed by: RADIOLOGY

## 2023-12-15 PROCEDURE — 77063 MAMMO DIGITAL SCREENING BILAT WITH TOMO: ICD-10-PCS | Mod: 26,,, | Performed by: RADIOLOGY

## 2023-12-15 PROCEDURE — 77067 SCR MAMMO BI INCL CAD: CPT | Mod: TC

## 2023-12-15 PROCEDURE — 77067 SCR MAMMO BI INCL CAD: CPT | Mod: 26,,, | Performed by: RADIOLOGY

## 2023-12-15 RX ORDER — HYDROCODONE BITARTRATE AND ACETAMINOPHEN 10; 325 MG/1; MG/1
1 TABLET ORAL EVERY 12 HOURS PRN
Qty: 40 TABLET | Refills: 0 | Status: SHIPPED | OUTPATIENT
Start: 2023-12-15 | End: 2024-01-11 | Stop reason: SDUPTHER

## 2023-12-18 ENCOUNTER — TELEPHONE (OUTPATIENT)
Dept: FAMILY MEDICINE | Facility: CLINIC | Age: 48
End: 2023-12-18
Payer: COMMERCIAL

## 2023-12-18 NOTE — TELEPHONE ENCOUNTER
----- Message from Mala [place] sent at 12/15/2023 10:04 AM CST -----  Contact: pt  Type: Needs Medical Advice      Who Called:Pt    Best Call Back Number:[phone]    Additional Information: Requesting a call back regarding Pt is following up on the request for the change in her rx HYDROcodone-acetaminophen (NORCO)  mg per tablet. Insurance will only cover a 30 day supply at a time. Pt needs office to have the rx take 1 to 2 daily as needed for pain. Pt is out and in pain.       [id number] - [place] [address] [address]  [address] [place] [address]  Phone: [phone] Fax: [phone]      Please Advise- Thank you

## 2024-01-11 NOTE — TELEPHONE ENCOUNTER
Last office visit: 11/10/2023  ----- Message from Darwin French sent at 1/11/2024 12:57 PM CST -----  Regarding: Refill request  Type:  RX Refill Request    Who Called: Pt  Refill or New Rx:refill    RX Name and Strength:HYDROcodone-acetaminophen (NORCO)  mg per tablet   How is the patient currently taking it? (ex. 1XDay):2xday as needed  Is this a 30 day or 90 day RX:30    Preferred Pharmacy with phone number:  [id number] - [place], [place] - [address] [facility] [address]  [address]  [place] [address]  Phone: [phone] Fax: [phone]        Local or Mail Order:local  Ordering Provider:Jamie    Would the patient rather a call back or a response via [facility]? Call back    Best Call Back Number:[phone]      Additional Information: STS SHE ALSO NEEDS TO TALK TO THE OFFICE ABOUT THE NEUROLOGIST THAT SHE WAS REFFERED TO.  THEY KEEP CONSTANTLY CANCELING HER.  NEEDS TO KNOW WHAT SHE NEEDS TO DO.     Please advise -- Thank you

## 2024-01-12 ENCOUNTER — PATIENT MESSAGE (OUTPATIENT)
Dept: FAMILY MEDICINE | Facility: CLINIC | Age: 49
End: 2024-01-12
Payer: COMMERCIAL

## 2024-01-12 RX ORDER — HYDROCODONE BITARTRATE AND ACETAMINOPHEN 10; 325 MG/1; MG/1
1 TABLET ORAL EVERY 12 HOURS PRN
Qty: 35 TABLET | Refills: 0 | Status: SHIPPED | OUTPATIENT
Start: 2024-01-12

## 2024-02-15 ENCOUNTER — TELEPHONE (OUTPATIENT)
Dept: FAMILY MEDICINE | Facility: CLINIC | Age: 49
End: 2024-02-15
Payer: COMMERCIAL

## 2024-02-15 NOTE — TELEPHONE ENCOUNTER
----- Message from Genesis Etienne sent at 2/15/2024 12:58 PM CST -----  Type:  RX Refill Request    Who Called:  pt  Refill or New Rx:  refill  RX Name and Strength:  HYDROcodone-acetaminophen (NORCO)  mg per tablet  How is the patient currently taking it? (ex. 1XDay):  as directed  Is this a 30 day or 90 day RX:  30  Preferred Pharmacy with phone number:   [id number] [place] [id number] [address]  [address] [address]  Phone: [phone] Fax: [phone]  Best Call Back Number:  [phone]  Additional Information:  pt is calling in regards to getting her Rx filled. Please call back and advise. Thanks!

## 2024-02-16 ENCOUNTER — TELEPHONE (OUTPATIENT)
Dept: FAMILY MEDICINE | Facility: CLINIC | Age: 49
End: 2024-02-16
Payer: COMMERCIAL

## 2024-02-16 NOTE — TELEPHONE ENCOUNTER
Patient informed that she will need to be seen before getting referral to Pain management. Also informed that Dr. Ayala has made the  personal decision to not prescribe narcotic meds, She voiced  understanding.

## 2024-02-22 ENCOUNTER — PATIENT MESSAGE (OUTPATIENT)
Dept: FAMILY MEDICINE | Facility: CLINIC | Age: 49
End: 2024-02-22
Payer: COMMERCIAL

## 2024-11-04 ENCOUNTER — HOSPITAL ENCOUNTER (EMERGENCY)
Facility: HOSPITAL | Age: 49
Discharge: HOME | End: 2024-11-04
Attending: FAMILY MEDICINE
Payer: COMMERCIAL

## 2024-11-04 VITALS
SYSTOLIC BLOOD PRESSURE: 111 MMHG | HEIGHT: 66 IN | RESPIRATION RATE: 16 BRPM | DIASTOLIC BLOOD PRESSURE: 64 MMHG | WEIGHT: 168 LBS | TEMPERATURE: 98 F | OXYGEN SATURATION: 100 % | BODY MASS INDEX: 27 KG/M2 | HEART RATE: 89 BPM

## 2024-11-04 DIAGNOSIS — R52 PAIN: ICD-10-CM

## 2024-11-04 DIAGNOSIS — M25.562 ACUTE PAIN OF LEFT KNEE: Primary | ICD-10-CM

## 2024-11-04 PROCEDURE — 96372 THER/PROPH/DIAG INJ SC/IM: CPT | Performed by: FAMILY MEDICINE

## 2024-11-04 PROCEDURE — 73562 X-RAY EXAM OF KNEE 3: CPT | Mod: TC,LT

## 2024-11-04 PROCEDURE — 73560 X-RAY EXAM OF KNEE 1 OR 2: CPT | Mod: 26,LT,, | Performed by: RADIOLOGY

## 2024-11-04 PROCEDURE — 99284 EMERGENCY DEPT VISIT MOD MDM: CPT | Mod: 25

## 2024-11-04 PROCEDURE — 63600175 PHARM REV CODE 636 W HCPCS: Performed by: FAMILY MEDICINE

## 2024-11-04 RX ORDER — KETOROLAC TROMETHAMINE 30 MG/ML
30 INJECTION, SOLUTION INTRAMUSCULAR; INTRAVENOUS
Status: COMPLETED | OUTPATIENT
Start: 2024-11-04 | End: 2024-11-04

## 2024-11-04 RX ORDER — KETOROLAC TROMETHAMINE 10 MG/1
10 TABLET, FILM COATED ORAL EVERY 6 HOURS PRN
Qty: 30 TABLET | Refills: 0 | Status: SHIPPED | OUTPATIENT
Start: 2024-11-04

## 2024-11-04 RX ADMIN — KETOROLAC TROMETHAMINE 30 MG: 30 INJECTION, SOLUTION INTRAMUSCULAR; INTRAVENOUS at 03:11

## 2024-11-04 NOTE — DISCHARGE INSTRUCTIONS
Follow-up with primary care physician as needed.  Return to ER for secondary evaluation if pain continues.

## 2024-11-04 NOTE — ED PROVIDER NOTES
Encounter Date: 11/4/2024       History     Chief Complaint   Patient presents with    Knee Injury     Presents with L knee pain several hours PTA      Patient is a 49-year-old female with past medical history that includes chronic pain and hyperlipidemia.  The patient reports doing house repair work that required kneeling down tonight.  Patient states that she was trying to get up she had left knee pain and was unable to straighten her leg out.  The patient thinks she might have popped her dislocated something in her knee but denies any sensation of any popping.  Patient states she is unable to bear weight due to pain.  Patient denies any history of any chronic knee pain or previous injuries to her left knee.  Patient describes pain to the medial aspect of her distal thigh just superior to her knee joint.      Review of patient's allergies indicates:   Allergen Reactions    Sulfa (sulfonamide antibiotics) Hives     Past Medical History:   Diagnosis Date    Cervical radiculopathy     Lumbar radiculopathy      Past Surgical History:   Procedure Laterality Date    carpel      both wrist     HERNIA REPAIR      TOE SURGERY       Family History   Problem Relation Name Age of Onset    Colon cancer Maternal Grandfather       Social History     Tobacco Use    Smoking status: Former     Types: Cigarettes     Passive exposure: Never    Smokeless tobacco: Current    Tobacco comments:     vape   Substance Use Topics    Alcohol use: Yes     Comment: occasional    Drug use: Never     Review of Systems   Musculoskeletal:  Positive for arthralgias.   All other systems reviewed and are negative.      Physical Exam     Initial Vitals [11/04/24 0253]   BP Pulse Resp Temp SpO2   111/64 89 16 97.6 °F (36.4 °C) 100 %      MAP       --         Physical Exam    Nursing note and vitals reviewed.  Constitutional: She appears well-developed and well-nourished. She is not diaphoretic. No distress.   HENT:   Head: Normocephalic and atraumatic.    Nose: Nose normal.   Eyes: Conjunctivae and EOM are normal. Right eye exhibits no discharge. Left eye exhibits no discharge. No scleral icterus.   Neck: Neck supple.   Normal range of motion.  Cardiovascular:  Normal rate, regular rhythm, normal heart sounds and intact distal pulses.           No murmur heard.  Pulmonary/Chest: Breath sounds normal. No respiratory distress.   Abdominal: Abdomen is soft. Bowel sounds are normal. She exhibits no distension. There is no abdominal tenderness.   Musculoskeletal:         General: Tenderness present. No edema.      Cervical back: Normal range of motion and neck supple.      Comments: Patient has normal range of motion except for left knee.  Patient has full flexion but extension fall short of right knee extension.  Right knee is able to hyperextend about 5 °.  Left knee falls shy of 0° by about 5-10 degrees.     Lymphadenopathy:     She has no cervical adenopathy.   Neurological: She is alert and oriented to person, place, and time. She has normal strength. No sensory deficit. GCS score is 15. GCS eye subscore is 4. GCS verbal subscore is 5. GCS motor subscore is 6.   Skin: Skin is warm. Capillary refill takes less than 2 seconds. No rash noted. No erythema.   Psychiatric: She has a normal mood and affect. Her behavior is normal. Judgment and thought content normal.         ED Course   Procedures  Labs Reviewed - No data to display       Imaging Results              X-Ray Knee 3 View Left (In process)                   X-Rays:   Independently Interpreted Readings:   Other Readings:  Three-view x-rays of left knee shows no fractures or dislocations.  There is some very mild osteoarthritic changes to patella.  Otherwise medial and lateral joint spaces appear to be without arthritic changes.    Medications   ketorolac injection 30 mg (30 mg Intramuscular Given 11/4/24 0323)     Medical Decision Making  Patient comes our facility with medial anterior left knee pain after  kneeling down for an extended period of time while doing housework.  X-ray showed no fracture dislocations.  There was no swelling.  There was no joint space tenderness or effusion.  Patient may have strained ligaments or tendons in the medial aspect of her knee in her patella.  Patient declined any orthopedic referral.  Patient was given crutches and knee immobilizer for ambulation.  Patient given Toradol for treatment of pain and inflammation.  Patient was instructed to follow up with her primary care physician or return to ER if pain did not improve with time.  Patient voiced understanding.  Patient was instructed to use crutches and knee immobilizer for ambulation with touchdown weight-bearing.  Patient may discontinue this when pain resolves.  Patient once again voiced understanding.    Amount and/or Complexity of Data Reviewed  Radiology: ordered. Decision-making details documented in ED Course.    Risk  Prescription drug management.                                      Clinical Impression:  Final diagnoses:  [R52] Pain  [M25.562] Acute pain of left knee (Primary)          ED Disposition Condition    Discharge Stable          ED Prescriptions       Medication Sig Dispense Start Date End Date Auth. Provider    ketorolac (TORADOL) 10 mg tablet Take 1 tablet (10 mg total) by mouth every 6 (six) hours as needed. 30 tablet 11/4/2024 -- Ranjeet Carrera MD          Follow-up Information    None     Follow-up with PCP.  Return to ER as needed.     Ranjeet Carrera MD  11/04/24 0406

## 2025-03-14 DIAGNOSIS — Z12.31 OTHER SCREENING MAMMOGRAM: Primary | ICD-10-CM

## 2025-06-13 ENCOUNTER — HOSPITAL ENCOUNTER (OUTPATIENT)
Dept: RADIOLOGY | Facility: HOSPITAL | Age: 50
Discharge: HOME | End: 2025-06-13
Attending: SPECIALIST
Payer: COMMERCIAL

## 2025-06-13 DIAGNOSIS — Z12.31 OTHER SCREENING MAMMOGRAM: ICD-10-CM

## 2025-06-13 PROCEDURE — 77067 SCR MAMMO BI INCL CAD: CPT | Mod: TC

## 2025-06-13 PROCEDURE — 77067 SCR MAMMO BI INCL CAD: CPT | Mod: 26,,, | Performed by: RADIOLOGY

## 2025-06-13 PROCEDURE — 77063 BREAST TOMOSYNTHESIS BI: CPT | Mod: 26,,, | Performed by: RADIOLOGY

## 2025-07-03 ENCOUNTER — OFFICE VISIT (OUTPATIENT)
Dept: PODIATRY | Facility: CLINIC | Age: 50
End: 2025-07-03
Payer: COMMERCIAL

## 2025-07-03 VITALS
BODY MASS INDEX: 28.12 KG/M2 | HEIGHT: 66 IN | SYSTOLIC BLOOD PRESSURE: 116 MMHG | HEART RATE: 103 BPM | RESPIRATION RATE: 18 BRPM | DIASTOLIC BLOOD PRESSURE: 73 MMHG | WEIGHT: 175 LBS

## 2025-07-03 DIAGNOSIS — M79.671 INFLAMMATORY HEEL PAIN, RIGHT: ICD-10-CM

## 2025-07-03 DIAGNOSIS — G57.92 NEURITIS OF LEFT FOOT: ICD-10-CM

## 2025-07-03 DIAGNOSIS — M79.672 INFLAMMATORY HEEL PAIN, LEFT: ICD-10-CM

## 2025-07-03 DIAGNOSIS — M79.672 BILATERAL FOOT PAIN: ICD-10-CM

## 2025-07-03 DIAGNOSIS — B35.1 ONYCHOMYCOSIS OF TOENAIL: ICD-10-CM

## 2025-07-03 DIAGNOSIS — M79.671 BILATERAL FOOT PAIN: ICD-10-CM

## 2025-07-03 DIAGNOSIS — M72.2 PLANTAR FASCIITIS, BILATERAL: Primary | ICD-10-CM

## 2025-07-03 DIAGNOSIS — G57.91 NEURITIS OF FOOT, RIGHT: ICD-10-CM

## 2025-07-03 PROCEDURE — 99999 PR PBB SHADOW E&M-EST. PATIENT-LVL IV: CPT | Mod: PBBFAC,,, | Performed by: PODIATRIST

## 2025-07-03 RX ORDER — NAPROXEN 500 MG/1
500 TABLET ORAL
COMMUNITY
Start: 2023-03-10

## 2025-07-03 RX ORDER — POLYETHYLENE GLYCOL 3350, SODIUM SULFATE, SODIUM CHLORIDE, POTASSIUM CHLORIDE, ASCORBIC ACID, SODIUM ASCORBATE 140-9-5.2G
KIT ORAL
COMMUNITY
Start: 2022-12-05

## 2025-07-03 RX ORDER — VALACYCLOVIR HYDROCHLORIDE 500 MG/1
500 TABLET, FILM COATED ORAL
COMMUNITY
Start: 2025-03-14

## 2025-07-03 RX ORDER — CLOTRIMAZOLE 1 G/ML
SOLUTION TOPICAL 2 TIMES DAILY
Qty: 30 ML | Refills: 5 | Status: SHIPPED | OUTPATIENT
Start: 2025-07-03

## 2025-07-03 RX ORDER — HYDROCODONE BITARTRATE AND ACETAMINOPHEN 5; 325 MG/1; MG/1
1 TABLET ORAL EVERY 6 HOURS PRN
COMMUNITY
Start: 2025-04-18

## 2025-07-03 RX ORDER — ROSUVASTATIN CALCIUM 10 MG/1
10 TABLET, COATED ORAL
COMMUNITY
Start: 2023-01-20

## 2025-07-03 RX ORDER — KETOROLAC TROMETHAMINE 30 MG/ML
60 INJECTION, SOLUTION INTRAMUSCULAR; INTRAVENOUS
Status: COMPLETED | OUTPATIENT
Start: 2025-07-03 | End: 2025-07-03

## 2025-07-03 RX ORDER — AMOXICILLIN 875 MG/1
875 TABLET, COATED ORAL 2 TIMES DAILY
COMMUNITY
Start: 2025-04-18

## 2025-07-03 RX ORDER — BETAMETHASONE SODIUM PHOSPHATE AND BETAMETHASONE ACETATE 3; 3 MG/ML; MG/ML
18 INJECTION, SUSPENSION INTRA-ARTICULAR; INTRALESIONAL; INTRAMUSCULAR; SOFT TISSUE
Status: COMPLETED | OUTPATIENT
Start: 2025-07-03 | End: 2025-07-03

## 2025-07-03 RX ORDER — DEXAMETHASONE 4 MG/1
4 TABLET ORAL 4 TIMES DAILY
COMMUNITY
Start: 2025-04-18

## 2025-07-03 RX ORDER — TIZANIDINE HYDROCHLORIDE 4 MG/1
CAPSULE, GELATIN COATED ORAL
COMMUNITY
Start: 2023-03-10

## 2025-07-03 RX ADMIN — BETAMETHASONE SODIUM PHOSPHATE AND BETAMETHASONE ACETATE 18 MG: 3; 3 INJECTION, SUSPENSION INTRA-ARTICULAR; INTRALESIONAL; INTRAMUSCULAR; SOFT TISSUE at 02:07

## 2025-07-03 RX ADMIN — KETOROLAC TROMETHAMINE 60 MG: 30 INJECTION, SOLUTION INTRAMUSCULAR; INTRAVENOUS at 02:07

## 2025-07-06 NOTE — PROGRESS NOTES
"Subjective:       Patient ID: Antonia Salazar is a 50 y.o. female.    Chief Complaint: Foot Pain, Heel Pain, Nail Problem, and Follow-up  Patient presents with complaint of bilateral heel pain, she does have a history of plantar fasciitis, we have not seen her in 3-1/2 years.  Patient relates heel pain has escalated over the last 2 months, she has tried new shoes and ninja insoles, oral anti-inflammatory without improvement.  Reports significant pain upon 1st steps especially in the morning but throughout the day after sitting for a while, and pain escalates throughout the day.  Patient relates foot pain moderate to severe right now at a 9/10.  She also inquires about prescription for treatment of fungal nails which have gotten worse, no pain regarding the nails    Past Medical History:   Diagnosis Date    Cervical radiculopathy     Lumbar radiculopathy      Past Surgical History:   Procedure Laterality Date    carpel      both wrist     HERNIA REPAIR      TOE SURGERY       Family History   Problem Relation Name Age of Onset    Colon cancer Maternal Grandfather       Social History     Socioeconomic History    Marital status: Single   Tobacco Use    Smoking status: Former     Types: Cigarettes     Passive exposure: Never    Smokeless tobacco: Current    Tobacco comments:     vape   Substance and Sexual Activity    Alcohol use: Yes     Comment: occasional    Drug use: Never    Sexual activity: Yes     Partners: Male       Current Medications[1]  Review of patient's allergies indicates:   Allergen Reactions    Sulfa (sulfonamide antibiotics) Hives       Review of Systems   Musculoskeletal:  Positive for arthralgias and gait problem.   All other systems reviewed and are negative.      Objective:      Vitals:    07/03/25 1415   BP: 116/73   Pulse: 103   Resp: 18   Weight: 79.4 kg (175 lb)   Height: 5' 6" (1.676 m)     Physical Exam  Vitals and nursing note reviewed.   Constitutional:       General: She is not in " acute distress.     Appearance: Normal appearance.   Cardiovascular:      Pulses:           Dorsalis pedis pulses are 2+ on the right side and 2+ on the left side.        Posterior tibial pulses are 2+ on the right side and 2+ on the left side.   Musculoskeletal:         General: Swelling and tenderness present.      Right foot: Bunion (Tailor's bunion bilateral, mild overlapping 2nd digit left) present.      Left foot: Bunion present.      Comments: Moderate pain upon palpation plantar fascial insertion at the calcaneus with some evidence of Walter's and medial plantar neuritis radiating to the arch with plantar heel edema bilateral   Feet:      Right foot:      Skin integrity: Skin integrity normal.      Toenail Condition: Right toenails are abnormally thick. Fungal disease present.     Left foot:      Skin integrity: Skin integrity normal.      Toenail Condition: Left toenails are abnormally thick. Fungal disease present.  Skin:     Capillary Refill: Capillary refill takes less than 2 seconds.   Neurological:      General: No focal deficit present.      Mental Status: She is alert.              Assessment:       1. Plantar fasciitis, bilateral    2. Inflammatory heel pain, left    3. Neuritis of foot, right    4. Bilateral foot pain    5. Inflammatory heel pain, right    6. Neuritis of left foot    7. Onychomycosis of toenail        Plan:         18 MG BETAMETHASONE IM HIP  60 MG TORADOL IM HIP  CLOTRIMAZOLE TOPICAL SOLUTION APPLY TWICE DAILY TOENAILS    Reviewed plantar fasciitis at length with patient.  Explained this is a strain/sprain of the supportive band through the arch on the bottom of the foot and needs to be supported properly along with treatment for inflammation.   Advised patient high pain level can be nerve related as this condition can cause inflammation of the medial plantar nerve, this areas sensitive with radiating pain  Other contributing factors, possibly could be due to her lower back, as  she has a history of lumbar radiculopathy  Explained bracing of the plantar fascia through appropriate arch support is crucial.  Encouraged patient to evaluate arch support as it needs to be firm with a heel cup, showed patient example  Discussed appropriate tennis shoes/walking/running, thick sole for shock absorption.  Advised patient to assess her tennis shoes, she needs to have a walking or running shoe with a very thick sole for shock absorption  Reviewed how important it is to do stretching especially before getting out of bed in the morning, shoes with arch support at bedside.  Try to stretch every time before getting up from a seated position  Tennis shoes with arch supports are to be on at all times of weight-bearing even in the house until pain-free.  Even when pain-free it is recommended she continue tennis shoes at home for the next 2-4 weeks to make sure heel pain has completely resolved  Long term we discussed appropriate shoes indoors, no slippers, slip-on shoes, flip-flops or walking in sock or bare feet  Reviewed ice/cool therapy and frequency this should be performed.    Reviewed over-the-counter topical treatments to reduce inflammation  Recommended IM steroid and IM Toradol injection, reviewed effectiveness in decreasing inflammation, potential side effects and length of time injections may be beneficial.  Explained she needs to use all other above conservative treatments daily along with these injections for best results    Reviewed oral anti-inflammatory /NSAID therapy with meals as directed no more than 2 weeks, start oral NSAID of choice in 48 hours  Due to thickness of the nails evident there is some fungal involvement, reviewed care and maintenance of the nails on a regular basis.  Avoid nail Polish shoes, reduce thickness of nails once weekly and apply prescription topical twice daily, can take 3 months to notice improvement in 8-12 months to grow out completely  Patient was in  understanding and agreement with treatment plan.  I counseled the patient on their conditions, implications and medical management.  Instructed patient to contact the office with any changes, questions, concerns, worsening of symptoms.   Total face to face time 30 minutes, exam, assessment, treatment, discussion, additional time for review of chart prior to and following appointment and visit documentation, consultation and coordination of care.    Follow up 3 weeks    This note was created using M*app2you voice recognition software that occasionally misinterpreted phrases or words.           [1]   Current Outpatient Medications   Medication Sig Dispense Refill    cyanocobalamin, vitamin B-12, (B-12 KIT INJ)       amoxicillin (AMOXIL) 875 MG tablet Take 875 mg by mouth 2 (two) times daily. (Patient not taking: Reported on 7/3/2025)      azelastine (ASTELIN) 137 mcg (0.1 %) nasal spray azelastine 137 mcg (0.1 %) nasal spray aerosol (Patient not taking: Reported on 7/3/2025)      clotrimazole (LOTRIMIN) 1 % Soln Apply topically 2 (two) times daily. 30 mL 5    dexAMETHasone (DECADRON) 4 MG Tab Take 4 mg by mouth 4 (four) times daily. (Patient not taking: Reported on 7/3/2025)      gabapentin (NEURONTIN) 300 MG capsule Take 1 capsule (300 mg total) by mouth every evening. 30 capsule 11    HYDROcodone-acetaminophen (NORCO) 5-325 mg per tablet Take 1 tablet by mouth every 6 (six) hours as needed. (Patient not taking: Reported on 7/3/2025)      linaclotide (LINZESS ORAL) Oral, Daily, 0 Refill(s), Maintenance (Patient not taking: Reported on 7/3/2025)      naproxen (EC NAPROSYN) 500 MG EC tablet 500 mg. (Patient not taking: Reported on 7/3/2025)      peg3350-sod sul-NaCl-KCl-asb-C (PLENVU) 140-9-5.2 gram PPkS See Instructions, Take dose 1 the day before at 3 pm and dose 2 the day of at 3 am, # 1 EA, 0 Refill(s), Pharmacy: [facility], Rectal bleeding  Family history of colon cancer  Family history of polyps  in the colon  Constipation  We... (Patient not taking: Reported on 7/3/2025)      rosuvastatin (CRESTOR) 10 MG tablet 10 mg. (Patient not taking: Reported on 7/3/2025)      tiZANidine 4 mg Cap 1-2 tabs, Oral, HS, PRN muscle spasm, # 60 EA, 0 Refill(s), Maintenance, Pharmacy: [facility], 167, cm, 03/10/23 13:13:00 CST, Height/Length Measured, 80, kg, 03/10/23 13:13:00 CST, Weight Dosing (Patient not taking: Reported on 7/3/2025)      valACYclovir (VALTREX) 500 MG tablet Take 500 mg by mouth. (Patient not taking: Reported on 7/3/2025)       No current facility-administered medications for this visit.

## 2025-08-20 ENCOUNTER — PATIENT MESSAGE (OUTPATIENT)
Dept: ADMINISTRATIVE | Facility: HOSPITAL | Age: 50
End: 2025-08-20
Payer: COMMERCIAL